# Patient Record
Sex: FEMALE | Race: BLACK OR AFRICAN AMERICAN | NOT HISPANIC OR LATINO
[De-identification: names, ages, dates, MRNs, and addresses within clinical notes are randomized per-mention and may not be internally consistent; named-entity substitution may affect disease eponyms.]

---
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
## 2023-09-06 NOTE — ED PROVIDER NOTE - CPE EDP EYES NORM
Called patients father. Explained test result to patients father as listed in visit encounter in chart review. Patients father verbalized understanding. No further questions at this time.    normal (ped)...